# Patient Record
Sex: FEMALE | Race: WHITE | NOT HISPANIC OR LATINO | URBAN - NONMETROPOLITAN AREA
[De-identification: names, ages, dates, MRNs, and addresses within clinical notes are randomized per-mention and may not be internally consistent; named-entity substitution may affect disease eponyms.]

---

## 2020-10-22 ENCOUNTER — OFFICE VISIT (OUTPATIENT)
Dept: URBAN - NONMETROPOLITAN AREA CLINIC 2 | Facility: CLINIC | Age: 50
End: 2020-10-22

## 2020-10-22 RX ORDER — INSULIN DEGLUDEC INJECTION 100 U/ML
INJECTION, SOLUTION SUBCUTANEOUS
Qty: 0 | Refills: 0 | Status: ACTIVE | COMMUNITY
Start: 1900-01-01 | End: 1900-01-01

## 2020-10-22 RX ORDER — FUROSEMIDE 40 MG/1
TAKE 1 TABLET (40 MG) BY ORAL ROUTE 2 TIMES PER DAY TABLET ORAL 2
Qty: 0 | Refills: 0 | Status: ACTIVE | COMMUNITY
Start: 1900-01-01 | End: 1900-01-01

## 2020-10-22 RX ORDER — SITAGLIPTIN PHOSPHATE 100 MG
TAKE 1 TABLET (100 MG) BY ORAL ROUTE ONCE DAILY TABLET ORAL 1
Qty: 0 | Refills: 0 | Status: ACTIVE | COMMUNITY
Start: 1900-01-01 | End: 1900-01-01

## 2021-03-09 ENCOUNTER — OFFICE VISIT (OUTPATIENT)
Dept: URBAN - NONMETROPOLITAN AREA CLINIC 2 | Facility: CLINIC | Age: 51
End: 2021-03-09
Payer: MEDICARE

## 2021-03-09 ENCOUNTER — LAB OUTSIDE AN ENCOUNTER (OUTPATIENT)
Dept: URBAN - NONMETROPOLITAN AREA CLINIC 2 | Facility: CLINIC | Age: 51
End: 2021-03-09

## 2021-03-09 VITALS
WEIGHT: 284 LBS | HEIGHT: 63 IN | TEMPERATURE: 97.3 F | HEART RATE: 66 BPM | BODY MASS INDEX: 50.32 KG/M2 | SYSTOLIC BLOOD PRESSURE: 144 MMHG | DIASTOLIC BLOOD PRESSURE: 90 MMHG

## 2021-03-09 DIAGNOSIS — R19.7 DIARRHEA: ICD-10-CM

## 2021-03-09 DIAGNOSIS — K76.0 FATTY LIVER: ICD-10-CM

## 2021-03-09 DIAGNOSIS — Z12.11 COLON CANCER SCREENING: ICD-10-CM

## 2021-03-09 DIAGNOSIS — K31.84 GASTROPARESIS: ICD-10-CM

## 2021-03-09 DIAGNOSIS — K21.9 GERD (GASTROESOPHAGEAL REFLUX DISEASE): ICD-10-CM

## 2021-03-09 PROCEDURE — 99214 OFFICE O/P EST MOD 30 MIN: CPT | Performed by: NURSE PRACTITIONER

## 2021-03-09 RX ORDER — SITAGLIPTIN PHOSPHATE 100 MG
TAKE 1 TABLET (100 MG) BY ORAL ROUTE ONCE DAILY TABLET ORAL 1
Qty: 0 | Refills: 0 | Status: ACTIVE | COMMUNITY
Start: 1900-01-01

## 2021-03-09 RX ORDER — PANTOPRAZOLE SODIUM 40 MG/1
TAKE 1 TABLET (40 MG) BY ORAL ROUTE 2 TIMES PER DAY TABLET, DELAYED RELEASE ORAL
Qty: 180 TABLET | Refills: 3 | OUTPATIENT
Start: 2021-03-09

## 2021-03-09 RX ORDER — INSULIN DEGLUDEC INJECTION 100 U/ML
INJECTION, SOLUTION SUBCUTANEOUS
Qty: 0 | Refills: 0 | Status: ACTIVE | COMMUNITY
Start: 1900-01-01

## 2021-03-09 RX ORDER — FUROSEMIDE 40 MG/1
TAKE 1 TABLET (40 MG) BY ORAL ROUTE 2 TIMES PER DAY TABLET ORAL 2
Qty: 0 | Refills: 0 | Status: ACTIVE | COMMUNITY
Start: 1900-01-01

## 2021-03-09 RX ORDER — AMITRIPTYLINE HYDROCHLORIDE 10 MG/1
1 TABLET AT BEDTIME TABLET, FILM COATED ORAL ONCE A DAY
Qty: 90 TABLET | Refills: 3 | OUTPATIENT
Start: 2021-03-09

## 2021-03-09 NOTE — HPI-TODAY'S VISIT:
7/2018 Mrs. Alexis presents for follow up of reflux, vomiting, gastroparesis, abnormal liver enzymes and diarrhea. Since her last visit her labs reveal elevated TSH at 30 and elevated ALP. She states she has been diagnosed with fatty liver in the past with hepatosplenomegaly. Her EGD reveals retained food. Her GES is positive for gastroapresis. Her reflux and vomiting have improved significant since correction of her thyroid function and on omeprazole 40mg daily and zantac 300mg QHS. She continues to struggle with diarrhea. This is worse after meals. The florastor and fiber have not helped. She has never had a colonoscopy. She is scheduled for knee surgery in August and is concerned this will be an issue with continence. Today she is feeling somewhat better overall. MB  9/19/2019  50 yo f former  now disabled 2/2 DM/bipolarism. last ov 7/1/2018 w Radhika. cont w probs constant burning despite protonix 40mg bid constant watery stool. 5-6 watery bms/d. can  occur anytime including in her sleep. will inc pc w/in 15-30' pc. pc, will have abd pain w eventual vomiting. can ruba only liquids. despite vomting and diarrhea-no wt loss. has xs gas and eructation. still has not done her colon.   3/9/2021 Meeta presents for follow-up of reflux, gastroparesis, irritable bowel syndrome with diarrhea predominance, chronic abdominal pain, NAFLD D with likely cirrhosis.  Since her last visit she has been following with hematology due to leukocytosis.  Dr. Carter ordered a CT scan done at [facility].  We do not have these results but she states that she had nodularity of her liver.  She does likely have cirrhosis with a history of hepatosplenomegaly due to Hernandez.  Her chronic work-up was negative other than a positive BRANDON, her IgG was normal.  She did see Dr. Hudson since her last visit.  She had a colonoscopy by Dr. Rendon in 2020 with normal random biopsies in 2 TA's.  She also had a HIDA scan with an 86% ejection fraction.  She is on Protonix 40 mg daily.  She continues to have postprandial nausea and vomiting.  She is not a candidate for Reglan or erythromycin due to her multiple medications.  She states that she has postprandial diarrhea.  She states that she is lost another 60 pounds over the past year unintentionally due to her symptoms.  Today she is here to follow-up with her diarrhea, abdominal pain, nausea vomiting, and her liver disease.  MB

## 2021-03-10 LAB
A/G RATIO: 1
AFP, SERUM, TUMOR MARKER: 1.4
ALBUMIN: 3.8
ALKALINE PHOSPHATASE: 96
ALT (SGPT): 15
AST (SGOT): 17
BASO (ABSOLUTE): 0.1
BASOS: 1
BILIRUBIN, TOTAL: <0.2
BUN/CREATININE RATIO: 31
BUN: 21
CALCIUM: 10
CARBON DIOXIDE, TOTAL: 21
CHLORIDE: 103
CREATININE: 0.67
EGFR IF AFRICN AM: 117
EGFR IF NONAFRICN AM: 101
EOS (ABSOLUTE): 0.5
EOS: 2
GLOBULIN, TOTAL: 4
GLUCOSE: 73
HEMATOCRIT: 41.1
HEMATOLOGY COMMENTS:: (no result)
HEMOGLOBIN: 13.4
IMMATURE CELLS: (no result)
IMMATURE GRANS (ABS): 0.1
IMMATURE GRANULOCYTES: 1
INR: 1
LYMPHS (ABSOLUTE): 4.6
LYMPHS: 20
MCH: 27.2
MCHC: 32.6
MCV: 83
MONOCYTES(ABSOLUTE): 1.2
MONOCYTES: 5
NEUTROPHILS (ABSOLUTE): 16.2
NEUTROPHILS: 71
NRBC: (no result)
PLATELETS: 419
POTASSIUM: 4
PROTEIN, TOTAL: 7.8
PROTHROMBIN TIME: 10.9
RBC: 4.93
RDW: 13
SODIUM: 140
WBC: 22.8

## 2021-03-11 ENCOUNTER — TELEPHONE ENCOUNTER (OUTPATIENT)
Dept: URBAN - METROPOLITAN AREA CLINIC 23 | Facility: CLINIC | Age: 51
End: 2021-03-11

## 2021-03-25 ENCOUNTER — OFFICE VISIT (OUTPATIENT)
Dept: URBAN - METROPOLITAN AREA MEDICAL CENTER 1 | Facility: MEDICAL CENTER | Age: 51
End: 2021-03-25
Payer: MEDICARE

## 2021-03-25 DIAGNOSIS — K29.60 ADENOPAPILLOMATOSIS GASTRICA: ICD-10-CM

## 2021-03-25 DIAGNOSIS — K31.89 ACQUIRED DEFORMITY OF DUODENUM: ICD-10-CM

## 2021-03-25 DIAGNOSIS — K20.80 ESOPHAGITIS, LOS ANGELES GRADE A: ICD-10-CM

## 2021-03-25 PROCEDURE — 43239 EGD BIOPSY SINGLE/MULTIPLE: CPT | Performed by: INTERNAL MEDICINE

## 2021-05-10 ENCOUNTER — OFFICE VISIT (OUTPATIENT)
Dept: URBAN - NONMETROPOLITAN AREA CLINIC 2 | Facility: CLINIC | Age: 51
End: 2021-05-10

## 2021-06-29 ENCOUNTER — TELEPHONE ENCOUNTER (OUTPATIENT)
Dept: URBAN - NONMETROPOLITAN AREA CLINIC 2 | Facility: CLINIC | Age: 51
End: 2021-06-29

## 2021-07-01 ENCOUNTER — WEB ENCOUNTER (OUTPATIENT)
Dept: URBAN - NONMETROPOLITAN AREA CLINIC 2 | Facility: CLINIC | Age: 51
End: 2021-07-01

## 2021-07-01 ENCOUNTER — OFFICE VISIT (OUTPATIENT)
Dept: URBAN - NONMETROPOLITAN AREA CLINIC 2 | Facility: CLINIC | Age: 51
End: 2021-07-01
Payer: MEDICARE

## 2021-07-01 ENCOUNTER — DASHBOARD ENCOUNTERS (OUTPATIENT)
Age: 51
End: 2021-07-01

## 2021-07-01 VITALS
SYSTOLIC BLOOD PRESSURE: 121 MMHG | WEIGHT: 256 LBS | BODY MASS INDEX: 45.36 KG/M2 | HEIGHT: 63 IN | HEART RATE: 118 BPM | DIASTOLIC BLOOD PRESSURE: 79 MMHG | TEMPERATURE: 97.8 F

## 2021-07-01 DIAGNOSIS — K31.84 GASTROPARESIS: ICD-10-CM

## 2021-07-01 DIAGNOSIS — K21.9 GERD (GASTROESOPHAGEAL REFLUX DISEASE): ICD-10-CM

## 2021-07-01 DIAGNOSIS — Z12.11 COLON CANCER SCREENING: ICD-10-CM

## 2021-07-01 DIAGNOSIS — R19.7 DIARRHEA: ICD-10-CM

## 2021-07-01 DIAGNOSIS — K76.0 FATTY LIVER: ICD-10-CM

## 2021-07-01 PROCEDURE — 99214 OFFICE O/P EST MOD 30 MIN: CPT | Performed by: INTERNAL MEDICINE

## 2021-07-01 RX ORDER — FUROSEMIDE 40 MG/1
TAKE 1 TABLET (40 MG) BY ORAL ROUTE 2 TIMES PER DAY TABLET ORAL 2
Qty: 0 | Refills: 0 | Status: ON HOLD | COMMUNITY
Start: 1900-01-01

## 2021-07-01 RX ORDER — RIFAXIMIN 550 MG/1
1 TABLET TABLET ORAL THREE TIMES A DAY
Qty: 42 TABLET | Refills: 2 | OUTPATIENT
Start: 2021-07-01 | End: 2021-08-12

## 2021-07-01 RX ORDER — AMITRIPTYLINE HYDROCHLORIDE 25 MG/1
1 TABLET AT BEDTIME TABLET, FILM COATED ORAL ONCE A DAY
Qty: 90 TABLET | Refills: 3 | OUTPATIENT

## 2021-07-01 RX ORDER — AMITRIPTYLINE HYDROCHLORIDE 10 MG/1
1 TABLET AT BEDTIME TABLET, FILM COATED ORAL ONCE A DAY
Qty: 90 TABLET | Refills: 3
Start: 2021-03-09

## 2021-07-01 RX ORDER — INSULIN DEGLUDEC INJECTION 100 U/ML
INJECTION, SOLUTION SUBCUTANEOUS
Qty: 0 | Refills: 0 | Status: ACTIVE | COMMUNITY
Start: 1900-01-01

## 2021-07-01 RX ORDER — PANTOPRAZOLE SODIUM 40 MG/1
TAKE 1 TABLET (40 MG) BY ORAL ROUTE 2 TIMES PER DAY TABLET, DELAYED RELEASE ORAL
Qty: 180 TABLET | Refills: 3 | Status: ACTIVE | COMMUNITY
Start: 2021-03-09

## 2021-07-01 RX ORDER — AMITRIPTYLINE HYDROCHLORIDE 10 MG/1
1 TABLET AT BEDTIME TABLET, FILM COATED ORAL ONCE A DAY
Qty: 90 TABLET | Refills: 3 | Status: ACTIVE | COMMUNITY
Start: 2021-03-09

## 2021-07-01 RX ORDER — SITAGLIPTIN PHOSPHATE 100 MG
TAKE 1 TABLET (100 MG) BY ORAL ROUTE ONCE DAILY TABLET ORAL 1
Qty: 0 | Refills: 0 | Status: ON HOLD | COMMUNITY
Start: 1900-01-01

## 2021-07-01 NOTE — HPI-TODAY'S VISIT:
7/2018 Mrs. Alexis presents for follow up of reflux, vomiting, gastroparesis, abnormal liver enzymes and diarrhea. Since her last visit her labs reveal elevated TSH at 30 and elevated ALP. She states she has been diagnosed with fatty liver in the past with hepatosplenomegaly. Her EGD reveals retained food. Her GES is positive for gastroapresis. Her reflux and vomiting have improved significant since correction of her thyroid function and on omeprazole 40mg daily and zantac 300mg QHS. She continues to struggle with diarrhea. This is worse after meals. The florastor and fiber have not helped. She has never had a colonoscopy. She is scheduled for knee surgery in August and is concerned this will be an issue with continence. Today she is feeling somewhat better overall. MB  9/19/2019  48 yo f former  now disabled 2/2 DM/bipolarism. last ov 7/1/2018 w Radhika. cont w probs constant burning despite protonix 40mg bid constant watery stool. 5-6 watery bms/d. can  occur anytime including in her sleep. will inc pc w/in 15-30' pc. pc, will have abd pain w eventual vomiting. can ruba only liquids. despite vomting and diarrhea-no wt loss. has xs gas and eructation. still has not done her colon.   3/9/2021 Meeta presents for follow-up of reflux, gastroparesis, irritable bowel syndrome with diarrhea predominance, chronic abdominal pain, NAFLD D with likely cirrhosis.  Since her last visit she has been following with hematology due to leukocytosis.  Dr. Carter ordered a CT scan done at [facility].  We do not have these results but she states that she had nodularity of her liver.  She does likely have cirrhosis with a history of hepatosplenomegaly due to Hernandez.  Her chronic work-up was negative other than a positive BRANDON, her IgG was normal.  She did see Dr. Hudson since her last visit.  She had a colonoscopy by Dr. Rendon in 2020 with normal random biopsies in 2 TA's.  She also had a HIDA scan with an 86% ejection fraction.  She is on Protonix 40 mg daily.  She continues to have postprandial nausea and vomiting.  She is not a candidate for Reglan or erythromycin due to her multiple medications.  She states that she has postprandial diarrhea.  She states that she is lost another 60 pounds over the past year unintentionally due to her symptoms.  Today she is here to follow-up with her diarrhea, abdominal pain, nausea vomiting, and her liver disease.  MB  7/1/2021 Meeta presents for follow-up of reflux, gastroparesis, and diarrhea along with fatty liver disease with likely advanced disease.  Since her last visit she did undergo repeat upper endoscopy with gastritis and reflux.  Her nausea and vomiting have stabilized on amitriptyline 10 mg at night and Protonix 40 in the morning.  Her main complaint today is diarrhea.  On a good day she has 4 postprandial bowel movements and on a bad day 9-10.  She denies any nocturnal symptoms.  She denies any fever or chills associated with her symptoms she is lost 90 pounds in the past year unintentionally however she is morbidly obese and a diabetic.  She had a contrasted CT scan again in June of her chest abdomen and pelvis, she does have fluid-filled loops of the small bowel with questionable enteritis versus mild ileus versus artifactual finding.  They do not comment on her liver on the CT scan however on her contrasted CT scan done in February 2021 at [facility] they do note a lobular edge to the liver with diffuse fatty infiltration and a 20 cm length with likely advanced liver disease.  Today we had a long discussion regarding her diarrhea.  This is typically postprandial.  She is on amitriptyline 10 mg nightly and Florastor daily.  She is not sure that these have helped at all.  She is not on well water, however she has not had any stool studies, she has been on multiple antibiotics over the past year due to UTIs.  Today her main complaint is recurrent and chronic diarrhea, she does agree that her nutrition plays a role and would like to meet with a nutritionist.  MB

## 2021-07-03 LAB
A/G RATIO: 1.3
ALBUMIN: 3.9
ALKALINE PHOSPHATASE: 112
ALT (SGPT): 11
AST (SGOT): 16
BASO (ABSOLUTE): 0.1
BASOS: 1
BILIRUBIN, TOTAL: <0.2
BUN/CREATININE RATIO: 20
BUN: 11
C-REACTIVE PROTEIN, QUANT: 23
CALCIUM: 9.1
CARBON DIOXIDE, TOTAL: 23
CHLORIDE: 103
CREATININE: 0.56
DEAMIDATED GLIADIN ABS, IGA: 9
DEAMIDATED GLIADIN ABS, IGG: 9
EGFR IF AFRICN AM: 123
EGFR IF NONAFRICN AM: 107
ENDOMYSIAL ANTIBODY IGA: NEGATIVE
EOS (ABSOLUTE): 0.5
EOS: 3
GLOBULIN, TOTAL: 2.9
GLUCOSE: 175
HEMATOCRIT: 38.5
HEMATOLOGY COMMENTS:: (no result)
HEMOGLOBIN: 12.3
IMMATURE CELLS: (no result)
IMMATURE GRANS (ABS): 0
IMMATURE GRANULOCYTES: 0
IMMUNOGLOBULIN A, QN, SERUM: 452
INR: 1
LYMPHS (ABSOLUTE): 3.8
LYMPHS: 22
MCH: 26.3
MCHC: 31.9
MCV: 82
MONOCYTES(ABSOLUTE): 0.8
MONOCYTES: 4
NEUTROPHILS (ABSOLUTE): 12.1
NEUTROPHILS: 70
NRBC: (no result)
PLATELETS: 361
POTASSIUM: 4.4
PROTEIN, TOTAL: 6.8
PROTHROMBIN TIME: 10.7
RBC: 4.68
RDW: 14.2
SEDIMENTATION RATE-WESTERGREN: 76
SODIUM: 140
T-TRANSGLUTAMINASE (TTG) IGA: <2
T-TRANSGLUTAMINASE (TTG) IGG: 4
T4,FREE(DIRECT): 2.07
TSH: <0.005
WBC: 17.3

## 2021-07-06 ENCOUNTER — TELEPHONE ENCOUNTER (OUTPATIENT)
Dept: URBAN - METROPOLITAN AREA CLINIC 92 | Facility: CLINIC | Age: 51
End: 2021-07-06

## 2021-07-22 ENCOUNTER — OFFICE VISIT (OUTPATIENT)
Dept: URBAN - METROPOLITAN AREA TELEHEALTH 2 | Facility: TELEHEALTH | Age: 51
End: 2021-07-22

## 2021-07-22 RX ORDER — AMITRIPTYLINE HYDROCHLORIDE 25 MG/1
1 TABLET AT BEDTIME TABLET, FILM COATED ORAL ONCE A DAY
Qty: 90 TABLET | Refills: 3 | Status: ACTIVE | COMMUNITY

## 2021-07-22 RX ORDER — RIFAXIMIN 550 MG/1
1 TABLET TABLET ORAL THREE TIMES A DAY
Qty: 42 TABLET | Refills: 2 | Status: ACTIVE | COMMUNITY
Start: 2021-07-01 | End: 2021-08-12

## 2021-07-22 RX ORDER — INSULIN DEGLUDEC INJECTION 100 U/ML
INJECTION, SOLUTION SUBCUTANEOUS
Qty: 0 | Refills: 0 | Status: ACTIVE | COMMUNITY
Start: 1900-01-01

## 2021-07-22 RX ORDER — SITAGLIPTIN PHOSPHATE 100 MG
TAKE 1 TABLET (100 MG) BY ORAL ROUTE ONCE DAILY TABLET ORAL 1
Qty: 0 | Refills: 0 | Status: ON HOLD | COMMUNITY
Start: 1900-01-01

## 2021-07-22 RX ORDER — AMITRIPTYLINE HYDROCHLORIDE 10 MG/1
1 TABLET AT BEDTIME TABLET, FILM COATED ORAL ONCE A DAY
Qty: 90 TABLET | Refills: 3 | Status: ACTIVE | COMMUNITY
Start: 2021-03-09

## 2021-07-22 RX ORDER — PANTOPRAZOLE SODIUM 40 MG/1
TAKE 1 TABLET (40 MG) BY ORAL ROUTE 2 TIMES PER DAY TABLET, DELAYED RELEASE ORAL
Qty: 180 TABLET | Refills: 3 | Status: ACTIVE | COMMUNITY
Start: 2021-03-09

## 2021-07-22 RX ORDER — FUROSEMIDE 40 MG/1
TAKE 1 TABLET (40 MG) BY ORAL ROUTE 2 TIMES PER DAY TABLET ORAL 2
Qty: 0 | Refills: 0 | Status: ON HOLD | COMMUNITY
Start: 1900-01-01

## 2021-07-23 ENCOUNTER — OFFICE VISIT (OUTPATIENT)
Dept: URBAN - METROPOLITAN AREA TELEHEALTH 2 | Facility: TELEHEALTH | Age: 51
End: 2021-07-23
Payer: MEDICARE

## 2021-07-23 DIAGNOSIS — K31.84 GASTROPARESIS: ICD-10-CM

## 2021-07-23 DIAGNOSIS — R19.7 DIARRHEA: ICD-10-CM

## 2021-07-23 PROCEDURE — 97802 MEDICAL NUTRITION INDIV IN: CPT | Performed by: DIETITIAN, REGISTERED

## 2021-07-23 RX ORDER — PANTOPRAZOLE SODIUM 40 MG/1
TAKE 1 TABLET (40 MG) BY ORAL ROUTE 2 TIMES PER DAY TABLET, DELAYED RELEASE ORAL
Qty: 180 TABLET | Refills: 3 | Status: ACTIVE | COMMUNITY
Start: 2021-03-09

## 2021-07-23 RX ORDER — AMITRIPTYLINE HYDROCHLORIDE 10 MG/1
1 TABLET AT BEDTIME TABLET, FILM COATED ORAL ONCE A DAY
Qty: 90 TABLET | Refills: 3 | Status: ACTIVE | COMMUNITY
Start: 2021-03-09

## 2021-07-23 RX ORDER — FUROSEMIDE 40 MG/1
TAKE 1 TABLET (40 MG) BY ORAL ROUTE 2 TIMES PER DAY TABLET ORAL 2
Qty: 0 | Refills: 0 | Status: ON HOLD | COMMUNITY
Start: 1900-01-01

## 2021-07-23 RX ORDER — AMITRIPTYLINE HYDROCHLORIDE 25 MG/1
1 TABLET AT BEDTIME TABLET, FILM COATED ORAL ONCE A DAY
Qty: 90 TABLET | Refills: 3 | Status: ACTIVE | COMMUNITY

## 2021-07-23 RX ORDER — INSULIN DEGLUDEC INJECTION 100 U/ML
INJECTION, SOLUTION SUBCUTANEOUS
Qty: 0 | Refills: 0 | Status: ACTIVE | COMMUNITY
Start: 1900-01-01

## 2021-07-23 RX ORDER — RIFAXIMIN 550 MG/1
1 TABLET TABLET ORAL THREE TIMES A DAY
Qty: 42 TABLET | Refills: 2 | Status: ACTIVE | COMMUNITY
Start: 2021-07-01 | End: 2021-08-12

## 2021-07-23 RX ORDER — SITAGLIPTIN PHOSPHATE 100 MG
TAKE 1 TABLET (100 MG) BY ORAL ROUTE ONCE DAILY TABLET ORAL 1
Qty: 0 | Refills: 0 | Status: ON HOLD | COMMUNITY
Start: 1900-01-01

## 2021-07-23 NOTE — HPI-TODAY'S VISIT:
Nutrition Initial Visit:  Patient has lost more than 100 pounds in the past year due to gastroparesis and more recently issues with diarrheal.  She had higher BG in the past, but now with reduced ability to eat, her BG is better controlled.  Her vomiting has improved, but diarrhea is worse.  BG sometimes just over 200.  When she takes her DM medications her BG goes low down to 54.  She has been drinking body armour drinks and eating things that are high in simple sugars and starches like apple sauce and banana and white bread, white rice, etc.

## 2021-08-31 ENCOUNTER — OFFICE VISIT (OUTPATIENT)
Dept: URBAN - NONMETROPOLITAN AREA CLINIC 2 | Facility: CLINIC | Age: 51
End: 2021-08-31

## 2021-10-14 ENCOUNTER — OFFICE VISIT (OUTPATIENT)
Dept: URBAN - NONMETROPOLITAN AREA CLINIC 2 | Facility: CLINIC | Age: 51
End: 2021-10-14

## 2022-06-01 ENCOUNTER — ERX REFILL RESPONSE (OUTPATIENT)
Dept: URBAN - NONMETROPOLITAN AREA CLINIC 2 | Facility: CLINIC | Age: 52
End: 2022-06-01

## 2022-06-01 RX ORDER — PANTOPRAZOLE SODIUM 40 MG/1
TAKE 1 TABLET (40 MG) BY ORAL ROUTE 2 TIMES PER DAY TABLET, DELAYED RELEASE ORAL
Qty: 180 TABLET | Refills: 3 | OUTPATIENT

## 2022-06-01 RX ORDER — PANTOPRAZOLE SODIUM 40 MG/1
TAKE 1 TABLET (40 MG) BY ORAL ROUTE 2 TIMES PER DAY TABLET, DELAYED RELEASE ORAL TWICE DAILY
Qty: 180 TABLET | Refills: 4 | OUTPATIENT

## 2022-08-09 NOTE — PHYSICAL EXAM HENT:
Head , normocephalic , atraumatic Julio Cedillo, 52 y.o.,  female    SUBJECTIVE  Ff-up    DM-  She is currently taking metformin, farxiga and trulicity, following endocrinology. Reviewed labs a1c 6.1    HL- currently on zetia and vascepa been intolerant to statins including latest trial crestor. Reports father/brother CAD/CABG. She has lost some weight. Reviewed labs  . Has. Denies symptoms, no chest pain, sob. False positive stress test 4/2022, cardiac cath 5/2022 normal coronaries. Depression/anxiety-she reports about the same, she takes zoloft 100-150 mg depending on stressors. And very occasional use of  taking ativan. 12 pills x 6 months    HTN- taking norvasc 5 mg, and losartan 100 mg. GERD- doing well on prilosec. Asthma- is doing well, no recent exacerbation, She is on advair. On singulair and zyrtec as well for allergies. Vit d def- she is s/p gastric bypass, says taking 50,000 iu vit d 2 x a week. Rash L axillary area, applying otc antifungal with some improvement    C/o joint pains different areas for years, excruciating pain one day and then spontaneous resolution, shoulders, knees, low back. Wondering if she has psoriatic arthritis like her mom, she does not have dx of psoriasis. Per pt utd with gyne care with dr. nayla ROBINS:  See HPI, all others negative        Patient Active Problem List   Diagnosis Code    HTN (hypertension) I10    Allergic rhinitis J30.9    Depression F32. A    S/P gastric bypass Z98.84    Anxiety F41.9    Iron deficiency E61.1    Vitamin D deficiency E55.9    Irregular menses N92.6    Vertigo R42    Onychomycosis B35.1    Impaired glucose tolerance R73.02    Rosacea L71.9    Gastroesophageal reflux disease without esophagitis K21.9    Asthma J45.909    Obesity, morbid (HCC) E66.01    Recurrent depression (HCC) F33.9    Pure hypercholesterolemia E78.00    Controlled type 2 diabetes mellitus without complication, without long-term current use of insulin (HCC) E11.9 Current Outpatient Medications   Medication Sig Dispense Refill    clotrimazole-betamethasone (LOTRISONE) topical cream Apply  to affected area two (2) times a day. 15 g 0    losartan (COZAAR) 100 mg tablet Take 1 Tablet by mouth in the morning. 90 Tablet 0    ergocalciferol (ERGOCALCIFEROL) 1,250 mcg (50,000 unit) capsule Take 1 Capsule by mouth two (2) days a week. 24 Capsule 0    dapagliflozin (FARXIGA) 10 mg tab tablet Take 1 Tablet by mouth in the morning. 90 Tablet 0    omeprazole (PRILOSEC) 20 mg capsule Take 1 Capsule by mouth in the morning. 90 Capsule 3    glucose blood VI test strips (ASCENSIA AUTODISC VI, ONE TOUCH ULTRA TEST VI) strip Check glucose level once daily 100 Strip 3    lancets misc Use as directed once daily 100 Each 11    metFORMIN ER (GLUCOPHAGE XR) 500 mg tablet Take 4 Tablets by mouth daily (with dinner). 360 Tablet 1    ezetimibe (ZETIA) 10 mg tablet Take 1 Tablet by mouth daily. 90 Tablet 3    montelukast (Singulair) 10 mg tablet Take 1 Tablet by mouth daily. 90 Tablet 1    fluticasone-Salmeterol (Advair HFA) 45-21 mcg/actuation inhaler Take 2 Puffs by inhalation two (2) times a day. 3 Each 1    fluticasone propionate (Flonase) 50 mcg/actuation nasal spray 2 Sprays by Both Nostrils route daily. 3 Each 1    amLODIPine (Norvasc) 5 mg tablet Take 1 Tablet by mouth daily. 90 Tablet 0    sertraline (Zoloft) 100 mg tablet Take 1.5 Tablets by mouth daily. 135 Tablet 0    icosapent ethyL (VASCEPA) 1 gram capsule Take 2 Capsules by mouth two (2) times daily (with meals). 360 Capsule 0    Trulicity 1.5 GJ/5.8 mL sub-q pen       ALPRAZolam (XANAX) 0.5 mg tablet Take 1 Tablet by mouth three (3) times daily as needed for Anxiety. Max Daily Amount: 1.5 mg. 12 Tablet 0    albuterol (PROVENTIL HFA, VENTOLIN HFA, PROAIR HFA) 90 mcg/actuation inhaler Take 1-2 Puffs by inhalation every four (4) hours as needed for Wheezing or Shortness of Breath.  3 Inhaler 0    cholecalciferol, vitamin D3, (VITAMIN D3 PO) Take  by mouth. ZINC PO Take  by mouth. ferrous sulfate (IRON PO) Take  by mouth. POTASSIUM-CALCIUM-MAGNESIUM PO Take  by mouth. B.infantis-B.ani-B.long-B.bifi 10-15 mg TbEC Take  by mouth. CETIRIZINE HCL (ZYRTEC PO) Take  by mouth daily. CYANOCOBALAMIN (VITAMIN B-12 SL) by SubLINGual route.          Allergies   Allergen Reactions    Entex [Phenylephrine-Guaifenesin] Unknown (comments)    Simvastatin Myalgia       Past Medical History:   Diagnosis Date    Asthma     Depression     Anxiety    GERD (gastroesophageal reflux disease)     Hypercholesterolemia     Hypertension        Social History     Socioeconomic History    Marital status:      Spouse name: Not on file    Number of children: Not on file    Years of education: Not on file    Highest education level: Not on file   Occupational History    Not on file   Tobacco Use    Smoking status: Never    Smokeless tobacco: Never   Substance and Sexual Activity    Alcohol use: Yes     Comment: 2 drinks per month    Drug use: No    Sexual activity: Not Currently     Partners: Male   Other Topics Concern    Not on file   Social History Narrative    Not on file     Social Determinants of Health     Financial Resource Strain: Not on file   Food Insecurity: Not on file   Transportation Needs: Not on file   Physical Activity: Not on file   Stress: Not on file   Social Connections: Not on file   Intimate Partner Violence: Not on file   Housing Stability: Not on file       Family History   Problem Relation Age of Onset    Asthma Mother     High Cholesterol Mother     Hypertension Mother     Thyroid Disease Mother     Cancer Mother 79        Thyroid Cancer    Depression Father     High Cholesterol Father     Hypertension Father     Colon Polyps Father     Depression Brother     High Cholesterol Brother     Hypertension Brother     Breast Cancer Maternal Grandmother     High Cholesterol Maternal Grandmother     Hypertension Maternal Grandmother     Diabetes Maternal Grandmother     Cancer Maternal Grandfather         prostate    High Cholesterol Maternal Grandfather     Hypertension Maternal Grandfather     Diabetes Maternal Grandfather     High Cholesterol Paternal Grandmother     Hypertension Paternal Grandmother     Diabetes Paternal Grandmother     Thyroid Disease Paternal Grandmother     High Cholesterol Paternal Grandfather     Hypertension Paternal Grandfather     Diabetes Paternal Grandfather          OBJECTIVE    Physical Exam:     Visit Vitals  /82 (BP 1 Location: Left upper arm, BP Patient Position: Sitting, BP Cuff Size: Adult)   Pulse 74   Temp 97 °F (36.1 °C) (Oral)   Resp 16   Ht 5' 8\" (1.727 m)   Wt 275 lb (124.7 kg)   LMP 11/05/2016   SpO2 98%   BMI 41.81 kg/m²       General: alert, obese,  in no apparent distress or pain  CVS: normal rate, regular rhythm, distinct S1 and S2  Lungs:clear to ausculation bilaterally, no crackles, wheezing or rhonchi noted  Abdomen: soft, NT, ND  Skin: L axilla reddish macular patch, no discharge  Psych:  mood and affect normal    Results for orders placed or performed during the hospital encounter of 07/27/22   [facility] SPECIMEN COL   Result Value Ref Range    [facility] SPECIMEN [facility]LL[facility]. Specimens collected/sent to [facility]. Please direct inquiries to ([phone]). ASSESSMENT/PLAN  Dangelo Damon was seen today for other, hypertension, anxiety and depression. Diagnoses and all orders for this visit:    Diabetes mellitus without complication, without long term current use of insulin   7.5>6.9>6.3>6.4>6.2  On metformin, trulicity, farxiga  Intolerant to statin  On ARB  ADA diet discussed  eye exam-- Dr. Langford Cos cmp, a1c, lipid panel,prior to next visit  Following dr. Zeeshan Wick  -     HEMOGLOBIN A1C [address]; Future  -     METABOLIC PANEL, COMPREHENSIVE; Future  -     LIPID PANEL;  Future  Primary hypertension-  controlled  Cont norvasc   Cont losartan monitoring    Hyperlipidemia   goal LDL< 70  Cont zetia, vascepa  intolerant to statin, but with fam hx    S/P gastric bypass    Postoperative malabsorption    Vitamin D Deficiency   s/p gastric bypass  Cont   vitamin d to 50k twice a week  Monitoring    Recurrent Depression  Fair control  Cont zoloft to 100-150 mg zoloft mg,   On prn ativan 12 pills x 6 months,advised to wean off  Monitoring    Anxiety    Allergic rhinitis, unspecified allergic rhinitis type  Cont singulair/zyrtec    Gastroesophageal reflux disease without esophagitis-  Stable, Cont PPI    Asthma, mild intermittent, uncomplicated  Controlled, cont advair, prn albuterol    Morbid Obesity HCC  Commended on wt loss     Hypertriglyceridemia     Obesity, Class III, BMI 40-49.9 (morbid obesity) (HCC)      Colon cancer screening  -     OCCULT BLOOD IMMUNOASSAY,DIAGNOSTIC; Future    Polyarthritis  -     SED RATE (ESR); Future  -     KEO COMPREHENSIVE PANEL; Future  -     RHEUMATOID FACTOR, QT; Future  -     CBC WITH AUTOMATED DIFF; Future    Intertrigo  -     clotrimazole-betamethasone (LOTRISONE) topical cream; Apply  to affected area two (2) times a day. Follow-up and Dispositions    Return in about 3 months (around 11/9/2022), or if symptoms worsen or fail to improve, for routine chronic illness care, fasting labs a week prior to your next visit. Patient understands plan of care. Patient has provided input and agrees with goals.

## 2022-10-27 ENCOUNTER — OFFICE VISIT (OUTPATIENT)
Dept: URBAN - NONMETROPOLITAN AREA CLINIC 2 | Facility: CLINIC | Age: 52
End: 2022-10-27

## 2024-08-15 ENCOUNTER — LAB OUTSIDE AN ENCOUNTER (OUTPATIENT)
Dept: URBAN - NONMETROPOLITAN AREA CLINIC 2 | Facility: CLINIC | Age: 54
End: 2024-08-15

## 2024-08-15 ENCOUNTER — OFFICE VISIT (OUTPATIENT)
Dept: URBAN - NONMETROPOLITAN AREA CLINIC 2 | Facility: CLINIC | Age: 54
End: 2024-08-15
Payer: MEDICARE

## 2024-08-15 VITALS
TEMPERATURE: 98 F | SYSTOLIC BLOOD PRESSURE: 115 MMHG | BODY MASS INDEX: 51.91 KG/M2 | DIASTOLIC BLOOD PRESSURE: 73 MMHG | HEART RATE: 98 BPM | HEIGHT: 63 IN | WEIGHT: 293 LBS

## 2024-08-15 DIAGNOSIS — K76.0 FATTY LIVER: ICD-10-CM

## 2024-08-15 DIAGNOSIS — Z12.11 COLON CANCER SCREENING: ICD-10-CM

## 2024-08-15 PROCEDURE — 99214 OFFICE O/P EST MOD 30 MIN: CPT | Performed by: NURSE PRACTITIONER

## 2024-08-15 RX ORDER — INSULIN GLARGINE 300 U/ML
INJECT 50 UNITS SUBCUTANEOUSLY TWO TIMES A DAY ***REFRIGERATE*** INJECTION, SOLUTION SUBCUTANEOUS
Qty: 6 MILLILITER | Refills: 3 | Status: ACTIVE | COMMUNITY

## 2024-08-15 RX ORDER — PANTOPRAZOLE SODIUM 40 MG/1
1 TABLET TABLET, DELAYED RELEASE ORAL ONCE A DAY
Status: ACTIVE | COMMUNITY

## 2024-08-15 RX ORDER — ATORVASTATIN CALCIUM 40 MG/1
TAKE ONE TABLET BY MOUTH ONCE DAILY AT BEDTIME TABLET, FILM COATED ORAL
Qty: 90 EACH | Refills: 1 | Status: ACTIVE | COMMUNITY

## 2024-08-15 RX ORDER — FUROSEMIDE 40 MG/1
TAKE 1 TABLET (40 MG) BY ORAL ROUTE 2 TIMES PER DAY TABLET ORAL 2
Qty: 0 | Refills: 0 | Status: ACTIVE | COMMUNITY
Start: 1900-01-01

## 2024-08-15 RX ORDER — LURASIDONE HYDROCHLORIDE 20 MG/1
TAKE ONE TABLET BY MOUTH ONCE DAILY WITH FOOD (AT LEAST 350 CALORIES) FOR 30 DAYS TABLET, FILM COATED ORAL
Qty: 30 EACH | Refills: 1 | Status: ACTIVE | COMMUNITY

## 2024-08-15 RX ORDER — ACETAMINOPHEN 325 MG/1
1 TABLET AS NEEDED TABLET, FILM COATED ORAL
Status: ACTIVE | COMMUNITY

## 2024-08-15 RX ORDER — INSULIN ASPART 100 [IU]/ML
INJECT 15 UNITS SUBCUTANEOUSLY BEFORE EACH MEAL AND ADDITIONALY AS DIRECTED MAX DAILY DOSE: 75 UNITS ***REFRIGERATE*** INJECTION, SOLUTION INTRAVENOUS; SUBCUTANEOUS
Qty: 15 MILLILITER | Refills: 2 | Status: ACTIVE | COMMUNITY

## 2024-08-15 RX ORDER — FLUOXETINE HYDROCHLORIDE 20 MG/1
TAKE ONE CAPSULE BY MOUTH ONCE DAILY CAPSULE ORAL
Qty: 30 EACH | Refills: 1 | Status: ACTIVE | COMMUNITY

## 2024-08-15 NOTE — HPI-TODAY'S VISIT:
7/2018 Mrs. Alexis presents for follow up of reflux, vomiting, gastroparesis, abnormal liver enzymes and diarrhea. Since her last visit her labs reveal elevated TSH at 30 and elevated ALP. She states she has been diagnosed with fatty liver in the past with hepatosplenomegaly. Her EGD reveals retained food. Her GES is positive for gastroapresis. Her reflux and vomiting have improved significant since correction of her thyroid function and on omeprazole 40mg daily and zantac 300mg QHS. She continues to struggle with diarrhea. This is worse after meals. The florastor and fiber have not helped. She has never had a colonoscopy. She is scheduled for knee surgery in August and is concerned this will be an issue with continence. Today she is feeling somewhat better overall. MB  9/19/2019  48 yo f former  now disabled 2/2 DM/bipolarism. last ov 7/1/2018 w Radhika. cont w probs constant burning despite protonix 40mg bid constant watery stool. 5-6 watery bms/d. can  occur anytime including in her sleep. will inc pc w/in 15-30' pc. pc, will have abd pain w eventual vomiting. can ruba only liquids. despite vomting and diarrhea-no wt loss. has xs gas and eructation. still has not done her colon.   3/9/2021 Meeta presents for follow-up of reflux, gastroparesis, irritable bowel syndrome with diarrhea predominance, chronic abdominal pain, NAFLD D with likely cirrhosis.  Since her last visit she has been following with hematology due to leukocytosis.  Dr. Carter ordered a CT scan done at [facility].  We do not have these results but she states that she had nodularity of her liver.  She does likely have cirrhosis with a history of hepatosplenomegaly due to Hernandez.  Her chronic work-up was negative other than a positive BRANDON, her IgG was normal.  She did see Dr. Hudson since her last visit.  She had a colonoscopy by Dr. Rendon in 2020 with normal random biopsies in 2 TA's.  She also had a HIDA scan with an 86% ejection fraction.  She is on Protonix 40 mg daily.  She continues to have postprandial nausea and vomiting.  She is not a candidate for Reglan or erythromycin due to her multiple medications.  She states that she has postprandial diarrhea.  She states that she is lost another 60 pounds over the past year unintentionally due to her symptoms.  Today she is here to follow-up with her diarrhea, abdominal pain, nausea vomiting, and her liver disease.  MB  7/1/2021 Meeta presents for follow-up of reflux, gastroparesis, and diarrhea along with fatty liver disease with likely advanced disease.  Since her last visit she did undergo repeat upper endoscopy with gastritis and reflux.  Her nausea and vomiting have stabilized on amitriptyline 10 mg at night and Protonix 40 in the morning.  Her main complaint today is diarrhea.  On a good day she has 4 postprandial bowel movements and on a bad day 9-10.  She denies any nocturnal symptoms.  She denies any fever or chills associated with her symptoms she is lost 90 pounds in the past year unintentionally however she is morbidly obese and a diabetic.  She had a contrasted CT scan again in June of her chest abdomen and pelvis, she does have fluid-filled loops of the small bowel with questionable enteritis versus mild ileus versus artifactual finding.  They do not comment on her liver on the CT scan however on her contrasted CT scan done in February 2021 at [place] they do note a lobular edge to the liver with diffuse fatty infiltration and a 20 cm length with likely advanced liver disease.  Today we had a long discussion regarding her diarrhea.  This is typically postprandial.  She is on amitriptyline 10 mg nightly and Florastor daily.  She is not sure that these have helped at all.  She is not on well water, however she has not had any stool studies, she has been on multiple antibiotics over the past year due to UTIs.  Today her main complaint is recurrent and chronic diarrhea, she does agree that her nutrition plays a role and would like to meet with a nutritionist.  MB 8/15/2021 Meeta presents for colorectal cancer screening.  Her last colonoscopy was in 2019 with 1 tubular adenoma.  She also has a history of fatty liver with likely advanced disease, she does have a nodular appearing liver on ultrasound with normal synthetic function.  Today she agrees to repeat labs and elastography.  Will schedule her screening colonoscopy.  Her reflux stable on pantoprazole 40 mg daily.  She has occasional dysphagia.  We will follow-up pending her workup.  MB

## 2024-08-16 ENCOUNTER — TELEPHONE ENCOUNTER (OUTPATIENT)
Dept: URBAN - NONMETROPOLITAN AREA CLINIC 2 | Facility: CLINIC | Age: 54
End: 2024-08-16

## 2024-08-16 LAB
A/G RATIO: 1.2
ABSOLUTE BASOPHILS: 74
ABSOLUTE EOSINOPHILS: 279
ABSOLUTE LYMPHOCYTES: 2852
ABSOLUTE MONOCYTES: 794
ABSOLUTE NEUTROPHILS: (no result)
ALBUMIN: 3.8
ALKALINE PHOSPHATASE: 87
ALT (SGPT): 9
AST (SGOT): 9
BASOPHILS: 0.5
BILIRUBIN, TOTAL: 0.2
BUN/CREATININE RATIO: 26
BUN: 27
CALCIUM: 8.9
CARBON DIOXIDE, TOTAL: 24
CHLORIDE: 105
CREATININE: 1.04
EGFR: 63
EOSINOPHILS: 1.9
GLOBULIN, TOTAL: 3.2
GLUCOSE: 150
HEMATOCRIT: 36.3
HEMOGLOBIN: 11.5
LYMPHOCYTES: 19.4
MCH: 26.1
MCHC: 31.7
MCV: 82.5
MONOCYTES: 5.4
MPV: 10.7
NEUTROPHILS: 72.8
PLATELET COUNT: 270
POTASSIUM: 4.4
PROTEIN, TOTAL: 7
RDW: 14.1
RED BLOOD CELL COUNT: 4.4
SODIUM: 140
WHITE BLOOD CELL COUNT: 14.7

## 2024-12-26 ENCOUNTER — TELEPHONE ENCOUNTER (OUTPATIENT)
Dept: URBAN - NONMETROPOLITAN AREA CLINIC 2 | Facility: CLINIC | Age: 54
End: 2024-12-26

## 2025-01-21 ENCOUNTER — TELEPHONE ENCOUNTER (OUTPATIENT)
Dept: URBAN - METROPOLITAN AREA CLINIC 5 | Facility: CLINIC | Age: 55
End: 2025-01-21

## 2025-01-30 ENCOUNTER — OFFICE VISIT (OUTPATIENT)
Dept: URBAN - METROPOLITAN AREA MEDICAL CENTER 1 | Facility: MEDICAL CENTER | Age: 55
End: 2025-01-30

## 2025-01-30 RX ORDER — FLUOXETINE HYDROCHLORIDE 20 MG/1
TAKE ONE CAPSULE BY MOUTH ONCE DAILY CAPSULE ORAL
Qty: 30 EACH | Refills: 1 | Status: ACTIVE | COMMUNITY

## 2025-01-30 RX ORDER — ACETAMINOPHEN 325 MG/1
1 TABLET AS NEEDED TABLET, FILM COATED ORAL
Status: ACTIVE | COMMUNITY

## 2025-01-30 RX ORDER — INSULIN ASPART 100 [IU]/ML
INJECT 15 UNITS SUBCUTANEOUSLY BEFORE EACH MEAL AND ADDITIONALY AS DIRECTED MAX DAILY DOSE: 75 UNITS ***REFRIGERATE*** INJECTION, SOLUTION INTRAVENOUS; SUBCUTANEOUS
Qty: 15 MILLILITER | Refills: 2 | Status: ACTIVE | COMMUNITY

## 2025-01-30 RX ORDER — PANTOPRAZOLE SODIUM 40 MG/1
1 TABLET TABLET, DELAYED RELEASE ORAL ONCE A DAY
Status: ACTIVE | COMMUNITY

## 2025-01-30 RX ORDER — ATORVASTATIN CALCIUM 40 MG/1
TAKE ONE TABLET BY MOUTH ONCE DAILY AT BEDTIME TABLET, FILM COATED ORAL
Qty: 90 EACH | Refills: 1 | Status: ACTIVE | COMMUNITY

## 2025-01-30 RX ORDER — INSULIN GLARGINE 300 U/ML
INJECT 50 UNITS SUBCUTANEOUSLY TWO TIMES A DAY ***REFRIGERATE*** INJECTION, SOLUTION SUBCUTANEOUS
Qty: 6 MILLILITER | Refills: 3 | Status: ACTIVE | COMMUNITY

## 2025-01-30 RX ORDER — LURASIDONE HYDROCHLORIDE 20 MG/1
TAKE ONE TABLET BY MOUTH ONCE DAILY WITH FOOD (AT LEAST 350 CALORIES) FOR 30 DAYS TABLET, FILM COATED ORAL
Qty: 30 EACH | Refills: 1 | Status: ACTIVE | COMMUNITY

## 2025-01-30 RX ORDER — FUROSEMIDE 40 MG/1
TAKE 1 TABLET (40 MG) BY ORAL ROUTE 2 TIMES PER DAY TABLET ORAL 2
Qty: 0 | Refills: 0 | Status: ACTIVE | COMMUNITY
Start: 1900-01-01

## 2025-05-30 ENCOUNTER — LAB OUTSIDE AN ENCOUNTER (OUTPATIENT)
Age: 55
End: 2025-05-30

## 2025-05-30 ENCOUNTER — OFFICE VISIT (OUTPATIENT)
Age: 55
End: 2025-05-30
Payer: COMMERCIAL

## 2025-05-30 DIAGNOSIS — R19.7 DIARRHEA: ICD-10-CM

## 2025-05-30 DIAGNOSIS — K21.9 GERD (GASTROESOPHAGEAL REFLUX DISEASE): ICD-10-CM

## 2025-05-30 DIAGNOSIS — R13.19 ESOPHAGEAL DYSPHAGIA: ICD-10-CM

## 2025-05-30 DIAGNOSIS — K75.81 METABOLIC DYSFUNCTION-ASSOCIATED STEATOHEPATITIS (MASH): ICD-10-CM

## 2025-05-30 DIAGNOSIS — Z12.11 COLON CANCER SCREENING: ICD-10-CM

## 2025-05-30 DIAGNOSIS — K31.84 GASTROPARESIS: ICD-10-CM

## 2025-05-30 PROBLEM — 40890009: Status: ACTIVE | Noted: 2025-05-30

## 2025-05-30 PROBLEM — 442685003: Status: ACTIVE | Noted: 2025-05-30

## 2025-05-30 PROCEDURE — 99214 OFFICE O/P EST MOD 30 MIN: CPT | Performed by: NURSE PRACTITIONER

## 2025-05-30 RX ORDER — PANTOPRAZOLE SODIUM 40 MG/1
1 TABLET TABLET, DELAYED RELEASE ORAL ONCE A DAY
Qty: 90 TABLET | Refills: 3

## 2025-05-30 RX ORDER — SERTRALINE HYDROCHLORIDE 100 MG/1
1 TABLET TABLET, FILM COATED ORAL ONCE A DAY
Status: ACTIVE | COMMUNITY

## 2025-05-30 RX ORDER — PANTOPRAZOLE SODIUM 40 MG/1
1 TABLET TABLET, DELAYED RELEASE ORAL ONCE A DAY
Status: ACTIVE | COMMUNITY

## 2025-05-30 RX ORDER — GABAPENTIN 100 MG/1
1 CAPSULE AT BEDTIME CAPSULE ORAL ONCE A DAY
Status: ACTIVE | COMMUNITY

## 2025-05-30 RX ORDER — FUROSEMIDE 40 MG/1
TAKE 1 TABLET (40 MG) BY ORAL ROUTE 2 TIMES PER DAY TABLET ORAL 2
Qty: 0 | Refills: 0 | Status: ACTIVE | COMMUNITY
Start: 1900-01-01

## 2025-05-30 RX ORDER — ATORVASTATIN CALCIUM 40 MG/1
TAKE ONE TABLET BY MOUTH ONCE DAILY AT BEDTIME TABLET, FILM COATED ORAL
Qty: 90 EACH | Refills: 1 | Status: ACTIVE | COMMUNITY

## 2025-05-30 RX ORDER — FLUOXETINE HYDROCHLORIDE 20 MG/1
TAKE ONE CAPSULE BY MOUTH ONCE DAILY CAPSULE ORAL
Qty: 30 EACH | Refills: 1 | Status: ACTIVE | COMMUNITY

## 2025-05-30 RX ORDER — LURASIDONE HYDROCHLORIDE 20 MG/1
TAKE ONE TABLET BY MOUTH ONCE DAILY WITH FOOD (AT LEAST 350 CALORIES) FOR 30 DAYS TABLET, FILM COATED ORAL
Qty: 30 EACH | Refills: 1 | Status: ACTIVE | COMMUNITY

## 2025-05-30 RX ORDER — ACETAMINOPHEN 325 MG/1
1 TABLET AS NEEDED TABLET, FILM COATED ORAL
Status: ACTIVE | COMMUNITY

## 2025-05-30 RX ORDER — INSULIN GLARGINE 300 U/ML
INJECT 50 UNITS SUBCUTANEOUSLY TWO TIMES A DAY ***REFRIGERATE*** INJECTION, SOLUTION SUBCUTANEOUS
Qty: 6 MILLILITER | Refills: 3 | Status: ACTIVE | COMMUNITY

## 2025-05-30 RX ORDER — INSULIN ASPART 100 [IU]/ML
INJECT 15 UNITS SUBCUTANEOUSLY BEFORE EACH MEAL AND ADDITIONALY AS DIRECTED MAX DAILY DOSE: 75 UNITS ***REFRIGERATE*** INJECTION, SOLUTION INTRAVENOUS; SUBCUTANEOUS
Qty: 15 MILLILITER | Refills: 2 | Status: ACTIVE | COMMUNITY

## 2025-05-30 NOTE — HPI-TODAY'S VISIT:
7/2018 Mrs. Alexis presents for follow up of reflux, vomiting, gastroparesis, abnormal liver enzymes and diarrhea. Since her last visit her labs reveal elevated TSH at 30 and elevated ALP. She states she has been diagnosed with fatty liver in the past with hepatosplenomegaly. Her EGD reveals retained food. Her GES is positive for gastroapresis. Her reflux and vomiting have improved significant since correction of her thyroid function and on omeprazole 40mg daily and zantac 300mg QHS. She continues to struggle with diarrhea. This is worse after meals. The florastor and fiber have not helped. She has never had a colonoscopy. She is scheduled for knee surgery in August and is concerned this will be an issue with continence. Today she is feeling somewhat better overall. MB  9/19/2019  48 yo f former  now disabled 2/2 DM/bipolarism. last ov 7/1/2018 w Radhika. cont w probs constant burning despite protonix 40mg bid constant watery stool. 5-6 watery bms/d. can  occur anytime including in her sleep. will inc pc w/in 15-30' pc. pc, will have abd pain w eventual vomiting. can ruba only liquids. despite vomting and diarrhea-no wt loss. has xs gas and eructation. still has not done her colon.   3/9/2021 Meeta presents for follow-up of reflux, gastroparesis, irritable bowel syndrome with diarrhea predominance, chronic abdominal pain, NAFLD D with likely cirrhosis.  Since her last visit she has been following with hematology due to leukocytosis.  Dr. Carter ordered a CT scan done at [facility].  We do not have these results but she states that she had nodularity of her liver.  She does likely have cirrhosis with a history of hepatosplenomegaly due to Hernandez.  Her chronic work-up was negative other than a positive BRANDON, her IgG was normal.  She did see Dr. Hudson since her last visit.  She had a colonoscopy by Dr. Rendon in 2020 with normal random biopsies in 2 TA's.  She also had a HIDA scan with an 86% ejection fraction.  She is on Protonix 40 mg daily.  She continues to have postprandial nausea and vomiting.  She is not a candidate for Reglan or erythromycin due to her multiple medications.  She states that she has postprandial diarrhea.  She states that she is lost another 60 pounds over the past year unintentionally due to her symptoms.  Today she is here to follow-up with her diarrhea, abdominal pain, nausea vomiting, and her liver disease.  MB  7/1/2021 Meeta presents for follow-up of reflux, gastroparesis, and diarrhea along with fatty liver disease with likely advanced disease.  Since her last visit she did undergo repeat upper endoscopy with gastritis and reflux.  Her nausea and vomiting have stabilized on amitriptyline 10 mg at night and Protonix 40 in the morning.  Her main complaint today is diarrhea.  On a good day she has 4 postprandial bowel movements and on a bad day 9-10.  She denies any nocturnal symptoms.  She denies any fever or chills associated with her symptoms she is lost 90 pounds in the past year unintentionally however she is morbidly obese and a diabetic.  She had a contrasted CT scan again in June of her chest abdomen and pelvis, she does have fluid-filled loops of the small bowel with questionable enteritis versus mild ileus versus artifactual finding.  They do not comment on her liver on the CT scan however on her contrasted CT scan done in February 2021 at [facility] they do note a lobular edge to the liver with diffuse fatty infiltration and a 20 cm length with likely advanced liver disease.  Today we had a long discussion regarding her diarrhea.  This is typically postprandial.  She is on amitriptyline 10 mg nightly and Florastor daily.  She is not sure that these have helped at all.  She is not on well water, however she has not had any stool studies, she has been on multiple antibiotics over the past year due to UTIs.  Today her main complaint is recurrent and chronic diarrhea, she does agree that her nutrition plays a role and would like to meet with a nutritionist.  MB 8/15/2021 Meeta presents for colorectal cancer screening.  Her last colonoscopy was in 2019 with 1 tubular adenoma.  She also has a history of fatty liver with likely advanced disease, she does have a nodular appearing liver on ultrasound with normal synthetic function.  Today she agrees to repeat labs and elastography.  Will schedule her screening colonoscopy.  Her reflux stable on pantoprazole 40 mg daily.  She has occasional dysphagia.  We will follow-up pending her workup.  MB 5/30/2025 Meeta presents for colonoscopy follow-up.  Her colonoscopy was normal.  Her bowels are stable.  Her reflux is stable so long as she takes pantoprazole 40 mg daily.  She does have distant vision to solids particularly with certain breads.  Today she would like to schedule EGD with dilation, continue high-dose PPI for now.  Okay to use Imodium as needed for intermittent episodes of postprandial diarrhea.  She does have a history of HERNANDEZ, we will also order a FibroScan.  MB

## 2025-06-13 PROBLEM — 197321007: Status: ACTIVE | Noted: 2025-06-13

## 2025-06-25 ENCOUNTER — TELEPHONE ENCOUNTER (OUTPATIENT)
Dept: URBAN - NONMETROPOLITAN AREA CLINIC 2 | Facility: CLINIC | Age: 55
End: 2025-06-25

## 2025-06-27 ENCOUNTER — CLAIMS CREATED FROM THE CLAIM WINDOW (OUTPATIENT)
Dept: URBAN - METROPOLITAN AREA CLINIC 117 | Facility: CLINIC | Age: 55
End: 2025-06-27
Payer: COMMERCIAL

## 2025-06-27 ENCOUNTER — OFFICE VISIT (OUTPATIENT)
Dept: URBAN - NONMETROPOLITAN AREA CLINIC 1 | Facility: CLINIC | Age: 55
End: 2025-06-27
Payer: COMMERCIAL

## 2025-06-27 DIAGNOSIS — K76.0 STEATOSIS OF LIVER: ICD-10-CM

## 2025-06-27 PROCEDURE — 76981 USE PARENCHYMA: CPT | Performed by: INTERNAL MEDICINE

## 2025-06-27 RX ORDER — ACETAMINOPHEN 325 MG/1
1 TABLET AS NEEDED TABLET, FILM COATED ORAL
Status: ACTIVE | COMMUNITY

## 2025-06-27 RX ORDER — PANTOPRAZOLE SODIUM 40 MG/1
1 TABLET TABLET, DELAYED RELEASE ORAL ONCE A DAY
Qty: 90 TABLET | Refills: 3 | Status: ACTIVE | COMMUNITY

## 2025-06-27 RX ORDER — LURASIDONE HYDROCHLORIDE 20 MG/1
TAKE ONE TABLET BY MOUTH ONCE DAILY WITH FOOD (AT LEAST 350 CALORIES) FOR 30 DAYS TABLET, FILM COATED ORAL
Qty: 30 EACH | Refills: 1 | Status: ACTIVE | COMMUNITY

## 2025-06-27 RX ORDER — INSULIN ASPART 100 [IU]/ML
INJECT 15 UNITS SUBCUTANEOUSLY BEFORE EACH MEAL AND ADDITIONALY AS DIRECTED MAX DAILY DOSE: 75 UNITS ***REFRIGERATE*** INJECTION, SOLUTION INTRAVENOUS; SUBCUTANEOUS
Qty: 15 MILLILITER | Refills: 2 | Status: ACTIVE | COMMUNITY

## 2025-06-27 RX ORDER — FUROSEMIDE 40 MG/1
TAKE 1 TABLET (40 MG) BY ORAL ROUTE 2 TIMES PER DAY TABLET ORAL 2
Qty: 0 | Refills: 0 | Status: ACTIVE | COMMUNITY
Start: 1900-01-01

## 2025-06-27 RX ORDER — SERTRALINE HYDROCHLORIDE 100 MG/1
1 TABLET TABLET, FILM COATED ORAL ONCE A DAY
Status: ACTIVE | COMMUNITY

## 2025-06-27 RX ORDER — FLUOXETINE HYDROCHLORIDE 20 MG/1
TAKE ONE CAPSULE BY MOUTH ONCE DAILY CAPSULE ORAL
Qty: 30 EACH | Refills: 1 | Status: ACTIVE | COMMUNITY

## 2025-06-27 RX ORDER — INSULIN GLARGINE 300 U/ML
INJECT 50 UNITS SUBCUTANEOUSLY TWO TIMES A DAY ***REFRIGERATE*** INJECTION, SOLUTION SUBCUTANEOUS
Qty: 6 MILLILITER | Refills: 3 | Status: ACTIVE | COMMUNITY

## 2025-06-27 RX ORDER — ATORVASTATIN CALCIUM 40 MG/1
TAKE ONE TABLET BY MOUTH ONCE DAILY AT BEDTIME TABLET, FILM COATED ORAL
Qty: 90 EACH | Refills: 1 | Status: ACTIVE | COMMUNITY

## 2025-06-27 RX ORDER — GABAPENTIN 100 MG/1
1 CAPSULE AT BEDTIME CAPSULE ORAL ONCE A DAY
Status: ACTIVE | COMMUNITY

## 2025-06-29 ENCOUNTER — TELEPHONE ENCOUNTER (OUTPATIENT)
Dept: URBAN - NONMETROPOLITAN AREA CLINIC 2 | Facility: CLINIC | Age: 55
End: 2025-06-29

## 2025-07-09 ENCOUNTER — WEB ENCOUNTER (OUTPATIENT)
Dept: URBAN - NONMETROPOLITAN AREA CLINIC 2 | Facility: CLINIC | Age: 55
End: 2025-07-09

## 2025-07-10 ENCOUNTER — WEB ENCOUNTER (OUTPATIENT)
Dept: URBAN - NONMETROPOLITAN AREA CLINIC 2 | Facility: CLINIC | Age: 55
End: 2025-07-10

## 2025-07-10 ENCOUNTER — LAB OUTSIDE AN ENCOUNTER (OUTPATIENT)
Dept: URBAN - NONMETROPOLITAN AREA CLINIC 2 | Facility: CLINIC | Age: 55
End: 2025-07-10